# Patient Record
Sex: FEMALE | Race: BLACK OR AFRICAN AMERICAN | NOT HISPANIC OR LATINO
[De-identification: names, ages, dates, MRNs, and addresses within clinical notes are randomized per-mention and may not be internally consistent; named-entity substitution may affect disease eponyms.]

---

## 2020-08-03 PROBLEM — Z00.00 ENCOUNTER FOR PREVENTIVE HEALTH EXAMINATION: Status: ACTIVE | Noted: 2020-08-03

## 2020-08-06 ENCOUNTER — APPOINTMENT (OUTPATIENT)
Dept: BARIATRICS | Facility: CLINIC | Age: 35
End: 2020-08-06
Payer: MEDICAID

## 2020-08-06 VITALS — WEIGHT: 243 LBS | HEIGHT: 63 IN | BODY MASS INDEX: 43.05 KG/M2

## 2020-08-06 PROCEDURE — 99203 OFFICE O/P NEW LOW 30 MIN: CPT

## 2020-08-27 ENCOUNTER — APPOINTMENT (OUTPATIENT)
Dept: BARIATRICS | Facility: CLINIC | Age: 35
End: 2020-08-27
Payer: MEDICAID

## 2020-08-27 PROCEDURE — 97802 MEDICAL NUTRITION INDIV IN: CPT

## 2020-09-03 ENCOUNTER — APPOINTMENT (OUTPATIENT)
Dept: BARIATRICS | Facility: CLINIC | Age: 35
End: 2020-09-03
Payer: MEDICAID

## 2020-09-03 VITALS — WEIGHT: 234 LBS | BODY MASS INDEX: 41.45 KG/M2

## 2020-09-03 PROCEDURE — 97803 MED NUTRITION INDIV SUBSEQ: CPT

## 2020-09-09 ENCOUNTER — TRANSCRIPTION ENCOUNTER (OUTPATIENT)
Age: 35
End: 2020-09-09

## 2020-09-14 NOTE — HISTORY OF PRESENT ILLNESS
[Home] : at home, [unfilled] , at the time of the visit. [Other Location: e.g. Home (Enter Location, City,State)___] : at [unfilled] [Verbal consent obtained from patient] : the patient, [unfilled] [de-identified] : Patient is a 35 year old female with a long history of morbid obesity not responsive to multiple dietary regimens. She has a BMI of 43.1 and is interested in having bariatric surgery.

## 2020-09-17 ENCOUNTER — APPOINTMENT (OUTPATIENT)
Dept: BARIATRICS | Facility: CLINIC | Age: 35
End: 2020-09-17
Payer: MEDICAID

## 2020-09-17 PROCEDURE — 97803 MED NUTRITION INDIV SUBSEQ: CPT

## 2020-10-07 ENCOUNTER — APPOINTMENT (OUTPATIENT)
Dept: BARIATRICS | Facility: CLINIC | Age: 35
End: 2020-10-07
Payer: MEDICAID

## 2020-10-07 VITALS — BODY MASS INDEX: 41.98 KG/M2 | WEIGHT: 237 LBS

## 2020-10-07 PROCEDURE — 97803 MED NUTRITION INDIV SUBSEQ: CPT

## 2020-10-21 ENCOUNTER — APPOINTMENT (OUTPATIENT)
Dept: BARIATRICS | Facility: CLINIC | Age: 35
End: 2020-10-21

## 2020-12-03 ENCOUNTER — APPOINTMENT (OUTPATIENT)
Dept: BARIATRICS | Facility: CLINIC | Age: 35
End: 2020-12-03

## 2020-12-28 ENCOUNTER — NON-APPOINTMENT (OUTPATIENT)
Age: 35
End: 2020-12-28

## 2020-12-31 ENCOUNTER — APPOINTMENT (OUTPATIENT)
Dept: BARIATRICS | Facility: CLINIC | Age: 35
End: 2020-12-31
Payer: MEDICAID

## 2020-12-31 VITALS — BODY MASS INDEX: 38.26 KG/M2 | WEIGHT: 216 LBS

## 2020-12-31 PROCEDURE — 99072 ADDL SUPL MATRL&STAF TM PHE: CPT

## 2020-12-31 PROCEDURE — 97803 MED NUTRITION INDIV SUBSEQ: CPT

## 2021-01-14 ENCOUNTER — RESULT REVIEW (OUTPATIENT)
Age: 36
End: 2021-01-14

## 2021-01-14 ENCOUNTER — OUTPATIENT (OUTPATIENT)
Dept: OUTPATIENT SERVICES | Facility: HOSPITAL | Age: 36
LOS: 1 days | End: 2021-01-14
Payer: COMMERCIAL

## 2021-01-14 DIAGNOSIS — Z01.818 ENCOUNTER FOR OTHER PREPROCEDURAL EXAMINATION: ICD-10-CM

## 2021-01-14 LAB
ALBUMIN SERPL ELPH-MCNC: 4.3 G/DL — SIGNIFICANT CHANGE UP (ref 3.3–5)
ALP SERPL-CCNC: 101 U/L — SIGNIFICANT CHANGE UP (ref 40–120)
ALT FLD-CCNC: 10 U/L — SIGNIFICANT CHANGE UP (ref 10–45)
ANION GAP SERPL CALC-SCNC: 10 MMOL/L — SIGNIFICANT CHANGE UP (ref 5–17)
APPEARANCE UR: CLEAR — SIGNIFICANT CHANGE UP
APTT BLD: 30.6 SEC — SIGNIFICANT CHANGE UP (ref 27.5–35.5)
AST SERPL-CCNC: 13 U/L — SIGNIFICANT CHANGE UP (ref 10–40)
BASOPHILS # BLD AUTO: 0.04 K/UL — SIGNIFICANT CHANGE UP (ref 0–0.2)
BASOPHILS NFR BLD AUTO: 0.5 % — SIGNIFICANT CHANGE UP (ref 0–2)
BILIRUB SERPL-MCNC: 0.7 MG/DL — SIGNIFICANT CHANGE UP (ref 0.2–1.2)
BILIRUB UR-MCNC: NEGATIVE — SIGNIFICANT CHANGE UP
BUN SERPL-MCNC: 7 MG/DL — SIGNIFICANT CHANGE UP (ref 7–23)
CALCIUM SERPL-MCNC: 9.8 MG/DL — SIGNIFICANT CHANGE UP (ref 8.4–10.5)
CHLORIDE SERPL-SCNC: 102 MMOL/L — SIGNIFICANT CHANGE UP (ref 96–108)
CO2 SERPL-SCNC: 29 MMOL/L — SIGNIFICANT CHANGE UP (ref 22–31)
COLOR SPEC: SIGNIFICANT CHANGE UP
CREAT SERPL-MCNC: 0.69 MG/DL — SIGNIFICANT CHANGE UP (ref 0.5–1.3)
DIFF PNL FLD: NEGATIVE — SIGNIFICANT CHANGE UP
EOSINOPHIL # BLD AUTO: 0.06 K/UL — SIGNIFICANT CHANGE UP (ref 0–0.5)
EOSINOPHIL NFR BLD AUTO: 0.8 % — SIGNIFICANT CHANGE UP (ref 0–6)
GLUCOSE SERPL-MCNC: 92 MG/DL — SIGNIFICANT CHANGE UP (ref 70–99)
GLUCOSE UR QL: NEGATIVE — SIGNIFICANT CHANGE UP
HCT VFR BLD CALC: 38 % — SIGNIFICANT CHANGE UP (ref 34.5–45)
HGB BLD-MCNC: 11.5 G/DL — SIGNIFICANT CHANGE UP (ref 11.5–15.5)
IMM GRANULOCYTES NFR BLD AUTO: 0.4 % — SIGNIFICANT CHANGE UP (ref 0–1.5)
INR BLD: 1.12 — SIGNIFICANT CHANGE UP (ref 0.88–1.16)
KETONES UR-MCNC: NEGATIVE — SIGNIFICANT CHANGE UP
LEUKOCYTE ESTERASE UR-ACNC: NEGATIVE — SIGNIFICANT CHANGE UP
LYMPHOCYTES # BLD AUTO: 2.86 K/UL — SIGNIFICANT CHANGE UP (ref 1–3.3)
LYMPHOCYTES # BLD AUTO: 35.8 % — SIGNIFICANT CHANGE UP (ref 13–44)
MCHC RBC-ENTMCNC: 28.3 PG — SIGNIFICANT CHANGE UP (ref 27–34)
MCHC RBC-ENTMCNC: 30.3 GM/DL — LOW (ref 32–36)
MCV RBC AUTO: 93.6 FL — SIGNIFICANT CHANGE UP (ref 80–100)
MONOCYTES # BLD AUTO: 0.34 K/UL — SIGNIFICANT CHANGE UP (ref 0–0.9)
MONOCYTES NFR BLD AUTO: 4.3 % — SIGNIFICANT CHANGE UP (ref 2–14)
NEUTROPHILS # BLD AUTO: 4.66 K/UL — SIGNIFICANT CHANGE UP (ref 1.8–7.4)
NEUTROPHILS NFR BLD AUTO: 58.2 % — SIGNIFICANT CHANGE UP (ref 43–77)
NITRITE UR-MCNC: NEGATIVE — SIGNIFICANT CHANGE UP
NRBC # BLD: 0 /100 WBCS — SIGNIFICANT CHANGE UP (ref 0–0)
PH UR: 6 — SIGNIFICANT CHANGE UP (ref 5–8)
PLATELET # BLD AUTO: 376 K/UL — SIGNIFICANT CHANGE UP (ref 150–400)
POTASSIUM SERPL-MCNC: 4.7 MMOL/L — SIGNIFICANT CHANGE UP (ref 3.5–5.3)
POTASSIUM SERPL-SCNC: 4.7 MMOL/L — SIGNIFICANT CHANGE UP (ref 3.5–5.3)
PROT SERPL-MCNC: 7.6 G/DL — SIGNIFICANT CHANGE UP (ref 6–8.3)
PROT UR-MCNC: NEGATIVE MG/DL — SIGNIFICANT CHANGE UP
PROTHROM AB SERPL-ACNC: 13.4 SEC — SIGNIFICANT CHANGE UP (ref 10.6–13.6)
RBC # BLD: 4.06 M/UL — SIGNIFICANT CHANGE UP (ref 3.8–5.2)
RBC # FLD: 13.2 % — SIGNIFICANT CHANGE UP (ref 10.3–14.5)
SODIUM SERPL-SCNC: 141 MMOL/L — SIGNIFICANT CHANGE UP (ref 135–145)
SP GR SPEC: <=1.005 — SIGNIFICANT CHANGE UP (ref 1–1.03)
UROBILINOGEN FLD QL: 0.2 E.U./DL — SIGNIFICANT CHANGE UP
WBC # BLD: 7.99 K/UL — SIGNIFICANT CHANGE UP (ref 3.8–10.5)
WBC # FLD AUTO: 7.99 K/UL — SIGNIFICANT CHANGE UP (ref 3.8–10.5)

## 2021-01-14 PROCEDURE — 85730 THROMBOPLASTIN TIME PARTIAL: CPT

## 2021-01-14 PROCEDURE — 93010 ELECTROCARDIOGRAM REPORT: CPT

## 2021-01-14 PROCEDURE — 87086 URINE CULTURE/COLONY COUNT: CPT

## 2021-01-14 PROCEDURE — 85025 COMPLETE CBC W/AUTO DIFF WBC: CPT

## 2021-01-14 PROCEDURE — 85610 PROTHROMBIN TIME: CPT

## 2021-01-14 PROCEDURE — 71046 X-RAY EXAM CHEST 2 VIEWS: CPT

## 2021-01-14 PROCEDURE — 71046 X-RAY EXAM CHEST 2 VIEWS: CPT | Mod: 26

## 2021-01-14 PROCEDURE — 93005 ELECTROCARDIOGRAM TRACING: CPT

## 2021-01-14 PROCEDURE — 81003 URINALYSIS AUTO W/O SCOPE: CPT

## 2021-01-14 PROCEDURE — 80053 COMPREHEN METABOLIC PANEL: CPT

## 2021-01-16 LAB
CULTURE RESULTS: NO GROWTH — SIGNIFICANT CHANGE UP
SPECIMEN SOURCE: SIGNIFICANT CHANGE UP

## 2021-01-20 ENCOUNTER — APPOINTMENT (OUTPATIENT)
Dept: BARIATRICS | Facility: CLINIC | Age: 36
End: 2021-01-20
Payer: MEDICAID

## 2021-01-20 VITALS — WEIGHT: 216 LBS | BODY MASS INDEX: 38.26 KG/M2

## 2021-01-20 PROCEDURE — 99442: CPT

## 2021-01-20 NOTE — ASSESSMENT
[FreeTextEntry1] : I have answered all of her questions regarding the operation and the perioperative instructions.

## 2021-01-20 NOTE — HISTORY OF PRESENT ILLNESS
[Home] : at home, [unfilled] , at the time of the visit. [Medical Office: ([facility])___] : at the medical office located in  [Verbal consent obtained from patient] : the patient, [unfilled] [de-identified] : Patient is scheduled for a laparoscopic sleeve gastrectomy. She has lost some weight preoperatively. She has been medically cleared. No upper GI study due to her lack of GERD symptoms and her age.

## 2021-01-22 ENCOUNTER — LABORATORY RESULT (OUTPATIENT)
Age: 36
End: 2021-01-22

## 2021-01-24 ENCOUNTER — TRANSCRIPTION ENCOUNTER (OUTPATIENT)
Age: 36
End: 2021-01-24

## 2021-01-24 NOTE — H&P ADULT - NSHPPHYSICALEXAM_GEN_ALL_CORE
GEN: NAD, awake, eyes open spontaneously. Generous body habitus  HEENT: NCAT, MMM, Trachea midline, normal conjunctiva, perrl  CHEST/LUNGS: Nonlaboured breathing, CTAB, bilateral breath sounds  CARDIAC: RRR, no m/r/g  ABDOMEN: Soft, Nontender, Nondistended, No rebound/guarding  MSK: No oedema, no gross deformity of extremities  SKIN: No rashes, no petechiae, no vesicles  NEURO: CN grossly intact, normal coordination, no focal motor or sensory deficits  PSYCH: Alert, appropriate, cooperative, with capacity and insight

## 2021-01-24 NOTE — H&P ADULT - HISTORY OF PRESENT ILLNESS
35F with PMH of MO (BMI 43) PSH of  presents for elective LSG. Pt failed to lose weight with dietary and lifestyle measures. Denies recent travel or sick contacts

## 2021-01-25 ENCOUNTER — INPATIENT (INPATIENT)
Facility: HOSPITAL | Age: 36
LOS: 1 days | Discharge: HOME | DRG: 621 | End: 2021-01-27
Attending: SURGERY | Admitting: SURGERY
Payer: MEDICAID

## 2021-01-25 ENCOUNTER — RESULT REVIEW (OUTPATIENT)
Age: 36
End: 2021-01-25

## 2021-01-25 ENCOUNTER — APPOINTMENT (OUTPATIENT)
Dept: BARIATRICS | Facility: HOSPITAL | Age: 36
End: 2021-01-25

## 2021-01-25 VITALS
OXYGEN SATURATION: 98 % | TEMPERATURE: 98 F | HEIGHT: 62.99 IN | WEIGHT: 233.69 LBS | SYSTOLIC BLOOD PRESSURE: 102 MMHG | RESPIRATION RATE: 18 BRPM | HEART RATE: 79 BPM | DIASTOLIC BLOOD PRESSURE: 70 MMHG

## 2021-01-25 DIAGNOSIS — Z98.890 OTHER SPECIFIED POSTPROCEDURAL STATES: Chronic | ICD-10-CM

## 2021-01-25 LAB
BLD GP AB SCN SERPL QL: NEGATIVE — SIGNIFICANT CHANGE UP
HCT VFR BLD CALC: 36 % — SIGNIFICANT CHANGE UP (ref 34.5–45)
HGB BLD-MCNC: 11 G/DL — LOW (ref 11.5–15.5)
MCHC RBC-ENTMCNC: 29.1 PG — SIGNIFICANT CHANGE UP (ref 27–34)
MCHC RBC-ENTMCNC: 30.6 GM/DL — LOW (ref 32–36)
MCV RBC AUTO: 95.2 FL — SIGNIFICANT CHANGE UP (ref 80–100)
NRBC # BLD: 0 /100 WBCS — SIGNIFICANT CHANGE UP (ref 0–0)
PLATELET # BLD AUTO: 281 K/UL — SIGNIFICANT CHANGE UP (ref 150–400)
RBC # BLD: 3.78 M/UL — LOW (ref 3.8–5.2)
RBC # FLD: 13.3 % — SIGNIFICANT CHANGE UP (ref 10.3–14.5)
RH IG SCN BLD-IMP: NEGATIVE — SIGNIFICANT CHANGE UP
WBC # BLD: 13.91 K/UL — HIGH (ref 3.8–10.5)
WBC # FLD AUTO: 13.91 K/UL — HIGH (ref 3.8–10.5)

## 2021-01-25 PROCEDURE — 43775 LAP SLEEVE GASTRECTOMY: CPT | Mod: GC

## 2021-01-25 PROCEDURE — 43775 LAP SLEEVE GASTRECTOMY: CPT | Mod: AS

## 2021-01-25 PROCEDURE — 88307 TISSUE EXAM BY PATHOLOGIST: CPT | Mod: 26

## 2021-01-25 RX ORDER — ACETAMINOPHEN 500 MG
1000 TABLET ORAL ONCE
Refills: 0 | Status: COMPLETED | OUTPATIENT
Start: 2021-01-25 | End: 2021-01-25

## 2021-01-25 RX ORDER — KETOROLAC TROMETHAMINE 30 MG/ML
15 SYRINGE (ML) INJECTION EVERY 6 HOURS
Refills: 0 | Status: DISCONTINUED | OUTPATIENT
Start: 2021-01-25 | End: 2021-01-27

## 2021-01-25 RX ORDER — INFLUENZA VIRUS VACCINE 15; 15; 15; 15 UG/.5ML; UG/.5ML; UG/.5ML; UG/.5ML
0.5 SUSPENSION INTRAMUSCULAR ONCE
Refills: 0 | Status: DISCONTINUED | OUTPATIENT
Start: 2021-01-25 | End: 2021-01-27

## 2021-01-25 RX ORDER — SODIUM CHLORIDE 9 MG/ML
1000 INJECTION, SOLUTION INTRAVENOUS
Refills: 0 | Status: DISCONTINUED | OUTPATIENT
Start: 2021-01-25 | End: 2021-01-27

## 2021-01-25 RX ORDER — HYDROMORPHONE HYDROCHLORIDE 2 MG/ML
0.5 INJECTION INTRAMUSCULAR; INTRAVENOUS; SUBCUTANEOUS
Refills: 0 | Status: DISCONTINUED | OUTPATIENT
Start: 2021-01-25 | End: 2021-01-25

## 2021-01-25 RX ORDER — PANTOPRAZOLE SODIUM 20 MG/1
40 TABLET, DELAYED RELEASE ORAL DAILY
Refills: 0 | Status: DISCONTINUED | OUTPATIENT
Start: 2021-01-25 | End: 2021-01-27

## 2021-01-25 RX ORDER — ENOXAPARIN SODIUM 100 MG/ML
40 INJECTION SUBCUTANEOUS ONCE
Refills: 0 | Status: COMPLETED | OUTPATIENT
Start: 2021-01-25 | End: 2021-01-25

## 2021-01-25 RX ORDER — SCOPALAMINE 1 MG/3D
1 PATCH, EXTENDED RELEASE TRANSDERMAL ONCE
Refills: 0 | Status: COMPLETED | OUTPATIENT
Start: 2021-01-25 | End: 2021-01-25

## 2021-01-25 RX ORDER — ONDANSETRON 8 MG/1
4 TABLET, FILM COATED ORAL EVERY 6 HOURS
Refills: 0 | Status: DISCONTINUED | OUTPATIENT
Start: 2021-01-25 | End: 2021-01-27

## 2021-01-25 RX ORDER — GABAPENTIN 400 MG/1
300 CAPSULE ORAL ONCE
Refills: 0 | Status: COMPLETED | OUTPATIENT
Start: 2021-01-25 | End: 2021-01-25

## 2021-01-25 RX ORDER — ACETAMINOPHEN 500 MG
650 TABLET ORAL EVERY 6 HOURS
Refills: 0 | Status: DISCONTINUED | OUTPATIENT
Start: 2021-01-25 | End: 2021-01-27

## 2021-01-25 RX ADMIN — SCOPALAMINE 1 PATCH: 1 PATCH, EXTENDED RELEASE TRANSDERMAL at 12:05

## 2021-01-25 RX ADMIN — Medication 1000 MILLIGRAM(S): at 12:05

## 2021-01-25 RX ADMIN — SCOPALAMINE 1 PATCH: 1 PATCH, EXTENDED RELEASE TRANSDERMAL at 16:56

## 2021-01-25 RX ADMIN — GABAPENTIN 300 MILLIGRAM(S): 400 CAPSULE ORAL at 12:05

## 2021-01-25 RX ADMIN — Medication 15 MILLIGRAM(S): at 19:26

## 2021-01-25 RX ADMIN — HYDROMORPHONE HYDROCHLORIDE 0.5 MILLIGRAM(S): 2 INJECTION INTRAMUSCULAR; INTRAVENOUS; SUBCUTANEOUS at 15:10

## 2021-01-25 RX ADMIN — Medication 400 MILLIGRAM(S): at 23:13

## 2021-01-25 RX ADMIN — PANTOPRAZOLE SODIUM 40 MILLIGRAM(S): 20 TABLET, DELAYED RELEASE ORAL at 15:14

## 2021-01-25 RX ADMIN — ENOXAPARIN SODIUM 40 MILLIGRAM(S): 100 INJECTION SUBCUTANEOUS at 12:05

## 2021-01-25 RX ADMIN — Medication 400 MILLIGRAM(S): at 17:56

## 2021-01-25 NOTE — BRIEF OPERATIVE NOTE - OPERATION/FINDINGS
Sleeve gastrectomy performed over 38 Fr. bougie with x1 green staple load and x5 blue staple loads. Staple line oversewn with 2-0 PDS running suture with omentopexy.

## 2021-01-26 ENCOUNTER — TRANSCRIPTION ENCOUNTER (OUTPATIENT)
Age: 36
End: 2021-01-26

## 2021-01-26 LAB
ANION GAP SERPL CALC-SCNC: 12 MMOL/L — SIGNIFICANT CHANGE UP (ref 5–17)
BASOPHILS # BLD AUTO: 0.02 K/UL — SIGNIFICANT CHANGE UP (ref 0–0.2)
BASOPHILS NFR BLD AUTO: 0.2 % — SIGNIFICANT CHANGE UP (ref 0–2)
BUN SERPL-MCNC: 5 MG/DL — LOW (ref 7–23)
CALCIUM SERPL-MCNC: 8.7 MG/DL — SIGNIFICANT CHANGE UP (ref 8.4–10.5)
CHLORIDE SERPL-SCNC: 102 MMOL/L — SIGNIFICANT CHANGE UP (ref 96–108)
CO2 SERPL-SCNC: 23 MMOL/L — SIGNIFICANT CHANGE UP (ref 22–31)
CREAT SERPL-MCNC: 0.62 MG/DL — SIGNIFICANT CHANGE UP (ref 0.5–1.3)
EOSINOPHIL # BLD AUTO: 0 K/UL — SIGNIFICANT CHANGE UP (ref 0–0.5)
EOSINOPHIL NFR BLD AUTO: 0 % — SIGNIFICANT CHANGE UP (ref 0–6)
GLUCOSE SERPL-MCNC: 94 MG/DL — SIGNIFICANT CHANGE UP (ref 70–99)
HCT VFR BLD CALC: 31.8 % — LOW (ref 34.5–45)
HGB BLD-MCNC: 10.1 G/DL — LOW (ref 11.5–15.5)
IMM GRANULOCYTES NFR BLD AUTO: 0.2 % — SIGNIFICANT CHANGE UP (ref 0–1.5)
LYMPHOCYTES # BLD AUTO: 1.95 K/UL — SIGNIFICANT CHANGE UP (ref 1–3.3)
LYMPHOCYTES # BLD AUTO: 16.1 % — SIGNIFICANT CHANGE UP (ref 13–44)
MAGNESIUM SERPL-MCNC: 1.6 MG/DL — SIGNIFICANT CHANGE UP (ref 1.6–2.6)
MCHC RBC-ENTMCNC: 29.5 PG — SIGNIFICANT CHANGE UP (ref 27–34)
MCHC RBC-ENTMCNC: 31.8 GM/DL — LOW (ref 32–36)
MCV RBC AUTO: 93 FL — SIGNIFICANT CHANGE UP (ref 80–100)
MONOCYTES # BLD AUTO: 0.79 K/UL — SIGNIFICANT CHANGE UP (ref 0–0.9)
MONOCYTES NFR BLD AUTO: 6.5 % — SIGNIFICANT CHANGE UP (ref 2–14)
NEUTROPHILS # BLD AUTO: 9.29 K/UL — HIGH (ref 1.8–7.4)
NEUTROPHILS NFR BLD AUTO: 77 % — SIGNIFICANT CHANGE UP (ref 43–77)
NRBC # BLD: 0 /100 WBCS — SIGNIFICANT CHANGE UP (ref 0–0)
PHOSPHATE SERPL-MCNC: 3.4 MG/DL — SIGNIFICANT CHANGE UP (ref 2.5–4.5)
PLATELET # BLD AUTO: 269 K/UL — SIGNIFICANT CHANGE UP (ref 150–400)
POTASSIUM SERPL-MCNC: 4 MMOL/L — SIGNIFICANT CHANGE UP (ref 3.5–5.3)
POTASSIUM SERPL-SCNC: 4 MMOL/L — SIGNIFICANT CHANGE UP (ref 3.5–5.3)
RBC # BLD: 3.42 M/UL — LOW (ref 3.8–5.2)
RBC # FLD: 13.3 % — SIGNIFICANT CHANGE UP (ref 10.3–14.5)
SODIUM SERPL-SCNC: 137 MMOL/L — SIGNIFICANT CHANGE UP (ref 135–145)
WBC # BLD: 12.08 K/UL — HIGH (ref 3.8–10.5)
WBC # FLD AUTO: 12.08 K/UL — HIGH (ref 3.8–10.5)

## 2021-01-26 RX ORDER — HEPARIN SODIUM 5000 [USP'U]/ML
7500 INJECTION INTRAVENOUS; SUBCUTANEOUS EVERY 8 HOURS
Refills: 0 | Status: DISCONTINUED | OUTPATIENT
Start: 2021-01-26 | End: 2021-01-27

## 2021-01-26 RX ORDER — MAGNESIUM SULFATE 500 MG/ML
2 VIAL (ML) INJECTION EVERY 4 HOURS
Refills: 0 | Status: COMPLETED | OUTPATIENT
Start: 2021-01-26 | End: 2021-01-26

## 2021-01-26 RX ORDER — METOCLOPRAMIDE HCL 10 MG
10 TABLET ORAL ONCE
Refills: 0 | Status: COMPLETED | OUTPATIENT
Start: 2021-01-26 | End: 2021-01-26

## 2021-01-26 RX ORDER — DEXAMETHASONE 0.5 MG/5ML
6 ELIXIR ORAL ONCE
Refills: 0 | Status: COMPLETED | OUTPATIENT
Start: 2021-01-26 | End: 2021-01-26

## 2021-01-26 RX ADMIN — Medication 50 GRAM(S): at 13:03

## 2021-01-26 RX ADMIN — Medication 15 MILLIGRAM(S): at 01:15

## 2021-01-26 RX ADMIN — ONDANSETRON 4 MILLIGRAM(S): 8 TABLET, FILM COATED ORAL at 09:21

## 2021-01-26 RX ADMIN — Medication 15 MILLIGRAM(S): at 17:04

## 2021-01-26 RX ADMIN — Medication 10 MILLIGRAM(S): at 03:34

## 2021-01-26 RX ADMIN — PANTOPRAZOLE SODIUM 40 MILLIGRAM(S): 20 TABLET, DELAYED RELEASE ORAL at 13:03

## 2021-01-26 RX ADMIN — HEPARIN SODIUM 7500 UNIT(S): 5000 INJECTION INTRAVENOUS; SUBCUTANEOUS at 17:04

## 2021-01-26 RX ADMIN — SCOPALAMINE 1 PATCH: 1 PATCH, EXTENDED RELEASE TRANSDERMAL at 06:42

## 2021-01-26 RX ADMIN — HEPARIN SODIUM 7500 UNIT(S): 5000 INJECTION INTRAVENOUS; SUBCUTANEOUS at 09:20

## 2021-01-26 RX ADMIN — Medication 6 MILLIGRAM(S): at 07:21

## 2021-01-26 RX ADMIN — ONDANSETRON 4 MILLIGRAM(S): 8 TABLET, FILM COATED ORAL at 01:35

## 2021-01-26 RX ADMIN — Medication 50 GRAM(S): at 09:21

## 2021-01-26 NOTE — DIETITIAN INITIAL EVALUATION ADULT. - OTHER INFO
35F with PMH of MO (BMI 43) PSH of  now s/p elective LSG () now POD #1.     On assessment, pt resting in bed. Currently on BARICLLIQ diet, tolerating PO. Pt had adequate PO intake this morning, consuming 4 oz fluids prior to 9:30 am. Pt c/o some nausea this AM- RN made aware. Discussed volumes of various cup sizes on tray table and encouraged aiming for 4 oz/hr as tolerated. Prepared with protein shakes (Premier) and appropriate vitamins at home. RD provided in-depth edu on diet advancement and specific nutrient needs s/p LSG. NKFA. No dietary restrictions at home. Pt endorsing intentional wt loss of 30 lbs from UBW of 246 lbs x 5 months. Skin: surgical incisions. GI: WDL per flowsheet. Reviewed importance of adequate protein intake to maintain lean muscle mass with expected weight loss s/p bariatric surgery. Pt verbalized understanding. Reviewed importance of maintaining adequate hydration to prevent complications from dehydration. Pt understanding of importance. RD to follow up per protocol.

## 2021-01-26 NOTE — DISCHARGE NOTE PROVIDER - NSDCCPGOAL_GEN_ALL_CORE_FT
To get better and follow your care plan as instructed. To get better and follow your care plan as instructed.  1) Please take Tylenol 650 mg every 4 to 6 hours by mouth for moderate pain control. Please do not exceed over 4,000 mg of Tylenol a day. 2) Please start taking Eliquis 2.5 mg by mouth twice a day starting Thursday January 28, 2021 3 days after surgery . 3) Please take Omeprazole 40 mg once a day by mouth.

## 2021-01-26 NOTE — DISCHARGE NOTE PROVIDER - NSDCCPCAREPLAN_GEN_ALL_CORE_FT
PRINCIPAL DISCHARGE DIAGNOSIS  Diagnosis: Morbid obesity  Assessment and Plan of Treatment:        PRINCIPAL DISCHARGE DIAGNOSIS  Diagnosis: Morbid obesity  Assessment and Plan of Treatment: 36 yo F with PMH of MO (BMI 43) PSH of  presents for elective LSG. Now s/p Robotic-assisted Sleeve Gastrectomy (). Pt tolerated the procedure well.  At time of discharge, pt was tolerating a bariatric clear liquid diet, and pt's pain was controlled. Plan is to follow up with Dr. Loredo in the office.  1) Please take Tylenol 650 mg every 4 to 6 hours by mouth for moderate pain control. Please do not exceed over 4,000 mg of Tylenol a day.  2) Please start taking Eliquis 2.5 mg by mouth twice a day starting  3 days after surgery .  3) Please take Omeprazole 40 mg once a day by mouth.

## 2021-01-26 NOTE — DISCHARGE NOTE PROVIDER - HOSPITAL COURSE
36 yo F with PMH of MO (BMI 43) PSH of  presented on day of admission for elective LSG. Post operatively patient was admitted to surgical floor for furthermonitoring and management. Her postoperative course was unremarkable with advancement of diet, passing trial of void, and pain control. On day of discharge patient was stable to be d/c'd home.       34 yo F with PMH of MO (BMI 43) PSH of  presents for elective LSG. Now s/p Robotic-assisted Sleeve Gastrectomy (). Pt tolerated the procedure well.  At time of discharge, pt was tolerating a bariatric clear liquid diet, and pt's pain was controlled. Plan is to follow up with Dr. Loredo in the office.

## 2021-01-26 NOTE — DISCHARGE NOTE PROVIDER - CARE PROVIDER_API CALL
Bucky Loredo (MD)  Surgery  [address]  [place], [place] [address]  Phone: [phone]  Fax: [phone]  Follow Up Time: 1 week

## 2021-01-26 NOTE — DIETITIAN INITIAL EVALUATION ADULT. - PERTINENT MEDS FT
Patient is a 89 year old female with PMH of T2DM, DLD, dementia, breast cancer s/p L hip IMN and R proximal femur CRPP POD #4 presenting for L hip fracture. LR 1000 ml, Zofran, Protonix

## 2021-01-26 NOTE — DISCHARGE NOTE PROVIDER - NSDCMRMEDTOKEN_GEN_ALL_CORE_FT
Eliquis 2.5 mg oral tablet: 1 tab(s) orally 2 times a day MDD:2 tablets  omeprazole 40 mg oral delayed release capsule: 1 cap(s) orally once a day MDD:1 capsule  Tylenol Regular Strength 325 mg oral tablet: 2 tab(s) orally every 6 hours, As Needed -for moderate pain - for severe pain MDD:8 tablets

## 2021-01-26 NOTE — DIETITIAN INITIAL EVALUATION ADULT. - OTHER CALCULATIONS
IBW used as pt exceeds 120% IBW (212%). Above energy needs calculated for wt maintenance (20-25kcal/kg).   Weeks 1-2 estimated needs: kcal/day (10-12kcal/kg), g pro/day (1.5-2.1g/kg), >/=64oz clear fluids. IBW used as pt exceeds 120% IBW (202%). Above energy needs calculated for wt maintenance (20-25kcal/kg).   Weeks 1-2 estimated needs: kcal/day (10-12kcal/kg), g pro/day (1.5-2.1g/kg), >/=64oz clear fluids.

## 2021-01-26 NOTE — DISCHARGE NOTE PROVIDER - NSDCFUADDINST_GEN_ALL_CORE_FT
Follow up with  in 1 week. Call the office at  to schedule your appointment.  You may shower; soap and water over incision sites. Do not scrub. Pat dry when done. No tub bathing or swimming until cleared. Keep incision sites out of the sun as scars will darken. No heavy lifting (>10lbs) or strenuous exercise. Diet: Bariatric Full Fluids. 60 grams protein daily.  64 fluid ounces water daily. Drink small sips throughout the day. Continue diet as outlined by paperwork received as a pre-operative patient. You should be urinating at least 3-4x per day. Call the office if you experience increasing abdominal pain, nausea, vomiting, or temperature >100.4F.  NO ASPIRIN OR NSAIDs until approved by Dr. Loredo. Avoid alcoholic beverages until cleared by Dr. Loredo.   Follow up with  in 1 week. Call the office at  to schedule your appointment.  You may shower; soap and water over incision sites. Do not scrub. Pat dry when done. No tub bathing or swimming until cleared. Keep incision sites out of the sun as scars will darken. No heavy lifting (>10lbs) or strenuous exercise. Diet: Bariatric Full Fluids. 60 grams protein daily.  64 fluid ounces water daily. Drink small sips throughout the day. Continue diet as outlined by paperwork received as a pre-operative patient. You should be urinating at least 3-4x per day. Call the office if you experience increasing abdominal pain, nausea, vomiting, or temperature >100.4F.  NO ASPIRIN OR NSAIDs until approved by Dr. Loredo. Avoid alcoholic beverages until cleared by Dr. Loredo.  1) Please take Tylenol 650 mg every 4 to 6 hours by mouth for moderate pain control. Please do not exceed over 4,000 mg of Tylenol a day. 2) Please start taking Eliquis 2.5 mg by mouth twice a day starting Thursday January 28, 2021 3 days after surgery . 3) Please take Omeprazole 40 mg once a day by mouth.

## 2021-01-27 ENCOUNTER — TRANSCRIPTION ENCOUNTER (OUTPATIENT)
Age: 36
End: 2021-01-27

## 2021-01-27 VITALS
DIASTOLIC BLOOD PRESSURE: 66 MMHG | TEMPERATURE: 98 F | RESPIRATION RATE: 16 BRPM | OXYGEN SATURATION: 98 % | HEART RATE: 56 BPM | SYSTOLIC BLOOD PRESSURE: 108 MMHG

## 2021-01-27 LAB
ANION GAP SERPL CALC-SCNC: 12 MMOL/L — SIGNIFICANT CHANGE UP (ref 5–17)
BUN SERPL-MCNC: 8 MG/DL — SIGNIFICANT CHANGE UP (ref 7–23)
CALCIUM SERPL-MCNC: 8.8 MG/DL — SIGNIFICANT CHANGE UP (ref 8.4–10.5)
CHLORIDE SERPL-SCNC: 101 MMOL/L — SIGNIFICANT CHANGE UP (ref 96–108)
CO2 SERPL-SCNC: 25 MMOL/L — SIGNIFICANT CHANGE UP (ref 22–31)
CREAT SERPL-MCNC: 0.7 MG/DL — SIGNIFICANT CHANGE UP (ref 0.5–1.3)
GLUCOSE SERPL-MCNC: 80 MG/DL — SIGNIFICANT CHANGE UP (ref 70–99)
HCT VFR BLD CALC: 32.1 % — LOW (ref 34.5–45)
HGB BLD-MCNC: 10.1 G/DL — LOW (ref 11.5–15.5)
MAGNESIUM SERPL-MCNC: 1.9 MG/DL — SIGNIFICANT CHANGE UP (ref 1.6–2.6)
MCHC RBC-ENTMCNC: 29.4 PG — SIGNIFICANT CHANGE UP (ref 27–34)
MCHC RBC-ENTMCNC: 31.5 GM/DL — LOW (ref 32–36)
MCV RBC AUTO: 93.6 FL — SIGNIFICANT CHANGE UP (ref 80–100)
NRBC # BLD: 0 /100 WBCS — SIGNIFICANT CHANGE UP (ref 0–0)
PHOSPHATE SERPL-MCNC: 2.4 MG/DL — LOW (ref 2.5–4.5)
PLATELET # BLD AUTO: 278 K/UL — SIGNIFICANT CHANGE UP (ref 150–400)
POTASSIUM SERPL-MCNC: 3.6 MMOL/L — SIGNIFICANT CHANGE UP (ref 3.5–5.3)
POTASSIUM SERPL-SCNC: 3.6 MMOL/L — SIGNIFICANT CHANGE UP (ref 3.5–5.3)
RBC # BLD: 3.43 M/UL — LOW (ref 3.8–5.2)
RBC # FLD: 13.7 % — SIGNIFICANT CHANGE UP (ref 10.3–14.5)
SODIUM SERPL-SCNC: 138 MMOL/L — SIGNIFICANT CHANGE UP (ref 135–145)
SURGICAL PATHOLOGY STUDY: SIGNIFICANT CHANGE UP
WBC # BLD: 12.52 K/UL — HIGH (ref 3.8–10.5)
WBC # FLD AUTO: 12.52 K/UL — HIGH (ref 3.8–10.5)

## 2021-01-27 PROCEDURE — 86900 BLOOD TYPING SEROLOGIC ABO: CPT

## 2021-01-27 PROCEDURE — 84100 ASSAY OF PHOSPHORUS: CPT

## 2021-01-27 PROCEDURE — 85025 COMPLETE CBC W/AUTO DIFF WBC: CPT

## 2021-01-27 PROCEDURE — 80048 BASIC METABOLIC PNL TOTAL CA: CPT

## 2021-01-27 PROCEDURE — 88307 TISSUE EXAM BY PATHOLOGIST: CPT

## 2021-01-27 PROCEDURE — 86850 RBC ANTIBODY SCREEN: CPT

## 2021-01-27 PROCEDURE — 90686 IIV4 VACC NO PRSV 0.5 ML IM: CPT

## 2021-01-27 PROCEDURE — 85027 COMPLETE CBC AUTOMATED: CPT

## 2021-01-27 PROCEDURE — S2900: CPT

## 2021-01-27 PROCEDURE — 83735 ASSAY OF MAGNESIUM: CPT

## 2021-01-27 PROCEDURE — 86901 BLOOD TYPING SEROLOGIC RH(D): CPT

## 2021-01-27 PROCEDURE — 36415 COLL VENOUS BLD VENIPUNCTURE: CPT

## 2021-01-27 RX ORDER — APIXABAN 2.5 MG/1
1 TABLET, FILM COATED ORAL
Qty: 60 | Refills: 0
Start: 2021-01-27 | End: 2021-02-25

## 2021-01-27 RX ORDER — OMEPRAZOLE 10 MG/1
1 CAPSULE, DELAYED RELEASE ORAL
Qty: 30 | Refills: 0
Start: 2021-01-27 | End: 2021-02-25

## 2021-01-27 RX ORDER — MAGNESIUM SULFATE 500 MG/ML
2 VIAL (ML) INJECTION
Refills: 0 | Status: DISCONTINUED | OUTPATIENT
Start: 2021-01-27 | End: 2021-01-27

## 2021-01-27 RX ORDER — ACETAMINOPHEN 500 MG
2 TABLET ORAL
Qty: 32 | Refills: 0
Start: 2021-01-27 | End: 2021-01-30

## 2021-01-27 RX ORDER — MAGNESIUM SULFATE 500 MG/ML
2 VIAL (ML) INJECTION ONCE
Refills: 0 | Status: DISCONTINUED | OUTPATIENT
Start: 2021-01-27 | End: 2021-01-27

## 2021-01-27 RX ADMIN — HEPARIN SODIUM 7500 UNIT(S): 5000 INJECTION INTRAVENOUS; SUBCUTANEOUS at 09:57

## 2021-01-27 RX ADMIN — Medication 50 GRAM(S): at 09:57

## 2021-01-27 RX ADMIN — Medication 15 MILLIGRAM(S): at 01:57

## 2021-01-27 RX ADMIN — HEPARIN SODIUM 7500 UNIT(S): 5000 INJECTION INTRAVENOUS; SUBCUTANEOUS at 00:03

## 2021-01-27 RX ADMIN — SCOPALAMINE 1 PATCH: 1 PATCH, EXTENDED RELEASE TRANSDERMAL at 05:56

## 2021-01-27 NOTE — PROGRESS NOTE ADULT - SUBJECTIVE AND OBJECTIVE BOX
POST-OPERATIVE NOTE    Procedure: Robotic-assisted Sleeve Gastrectomy    Diagnosis/Indication: Morbid Obesity    Surgeon: Dr. Gertrude ISLAS: The patient does not have any complaints. She was examined at the bedside. The patient denies having chest pain, SOB, dizziness, chills, nausea, vomiting.    O:  T(C): 36.4 (21 @ 14:47), Max: 36.4 (21 @ 14:47)  T(F): 97.5 (21 @ 14:47), Max: 97.5 (21 @ 14:47)  HR: 97 (21 @ 16:42) (74 - 109)  BP: 123/80 (21 @ 16:42) (115/68 - 140/90)  RR: 17 (21 @ 16:42) (16 - 19)  SpO2: 100% (21 @ 16:42) (96% - 100%)  Wt(kg): --            Gen: NAD, resting comfortably in bed  C/V: NSR, S1, S2 present  Pulm: Nonlabored breathing, no respiratory distress  Abd: soft, ND, appropriately TTP @ the incision sites, no rebound tenderness, guarding, rigidity, incisions C/D/I  Neuro: AAOx3      A/P: 34 yo Female with PMH of MO (BMI 43) PSH of  presents for elective LSG. Now s/p Robotic-assisted Sleeve Gastrectomy ().    BCLD/IVF  Pain and nausea control  Protonix  CBC 6 hours post-op  SQH POD1 if Hgb stable  AM labs  Nutrition consult in AM  Home meds as appropriate    
INTERVAL HPI/OVERNIGHT EVENTS: pain controlled, encouraged pt to ambulate, use IS and keep track of po intake, post op h/h: 11.0/36.0, passed TOV (200cc), VSS  1/25: LSG, POC WNL      STATUS POST:  Robotic-Assisted Sleeve Gastrectomy    POST OPERATIVE DAY #: 1    SUBJECTIVE:  Patient examined bedside with chief. Patient complains of nausea and spit up. Patient reports her incisional pain is well controlled. Patient reports she is ambulating and using incentive spirometer. Patient denies SOB and chest pain, . Patient making adequate urine output.        Vital Signs Last 24 Hrs  T(C): 36.9 (26 Jan 2021 05:44), Max: 37 (26 Jan 2021 00:37)  T(F): 98.4 (26 Jan 2021 05:44), Max: 98.6 (26 Jan 2021 00:37)  HR: 89 (26 Jan 2021 05:44) (74 - 110)  BP: 116/76 (26 Jan 2021 05:44) (102/70 - 140/90)  BP(mean): 96 (25 Jan 2021 17:12) (86 - 109)  RR: 18 (26 Jan 2021 05:44) (16 - 19)  SpO2: 98% (26 Jan 2021 05:44) (94% - 100%)  I&O's Detail    25 Jan 2021 07:01  -  26 Jan 2021 07:00  --------------------------------------------------------  IN:    IV PiggyBack: 100 mL    Lactated Ringers: 2212.5 mL  Total IN: 2312.5 mL    OUT:    Oral Fluid: 0 mL    Voided (mL): 800 mL  Total OUT: 800 mL    Total NET: 1512.5 mL          General: NAD, resting comfortably in bed  C/V: NSR  Pulm: Nonlabored breathing, no respiratory distress  Abd:   Extrem: WWP, no edema, SCDs in place        LABS:                        11.0   13.91 )-----------( 281      ( 25 Jan 2021 20:23 )             36.0               
INTERVAL HPI/OVERNIGHT EVENTS: pt tolerating po better, -n/-v, VSS   1/26:c/o nausea /emesisx1 , minimal po intake , given decadron, encouraged to walk , VSS       STATUS POST:  Robotic- Assisted Sleeve Gastrectomy    POST OPERATIVE DAY #: 2    SUBJECTIVE: Patient examined bedside with chief. Patient reports that she is tolerating diet and her pain is well controlled. Patient reports she is ambulating and using incentive spirometer. Patient denies SOB, chest pain, nausea and emesis. Patient making adequate urine output.      heparin   Injectable 7500 Unit(s) SubCutaneous every 8 hours      Vital Signs Last 24 Hrs  T(C): 36.9 (27 Jan 2021 05:15), Max: 37.6 (26 Jan 2021 16:05)  T(F): 98.4 (27 Jan 2021 05:15), Max: 99.6 (26 Jan 2021 16:05)  HR: 77 (27 Jan 2021 05:15) (69 - 77)  BP: 100/65 (27 Jan 2021 05:15) (100/63 - 123/79)  BP(mean): 88 (26 Jan 2021 16:05) (88 - 88)  RR: 18 (27 Jan 2021 05:15) (16 - 18)  SpO2: 95% (27 Jan 2021 05:15) (95% - 99%)  I&O's Detail    25 Jan 2021 07:01  -  26 Jan 2021 07:00  --------------------------------------------------------  IN:    IV PiggyBack: 100 mL    Lactated Ringers: 2212.5 mL  Total IN: 2312.5 mL    OUT:    Oral Fluid: 0 mL    Voided (mL): 800 mL  Total OUT: 800 mL    Total NET: 1512.5 mL      26 Jan 2021 07:01  -  27 Jan 2021 06:09  --------------------------------------------------------  IN:    IV PiggyBack: 100 mL    Lactated Ringers: 3150 mL    Oral Fluid: 220 mL  Total IN: 3470 mL    OUT:    Voided (mL): 2550 mL  Total OUT: 2550 mL    Total NET: 920 mL          General: NAD, resting comfortably in bed  C/V: NSR  Pulm: Nonlabored breathing, no respiratory distress  Abd: Soft, non-distended, TTP around incision site, incision clean dry and intact  Extrem: WWP, no edema, SCDs in place      LABS:                        10.1   12.08 )-----------( 269      ( 26 Jan 2021 08:09 )             31.8     01-26    137  |  102  |  5<L>  ----------------------------<  94  4.0   |  23  |  0.62    Ca    8.7      26 Jan 2021 08:09  Phos  3.4     01-26  Mg     1.6     01-26

## 2021-01-27 NOTE — PROGRESS NOTE ADULT - ASSESSMENT
36 yo F with PMH of MO (BMI 43) PSH of  presents for elective LSG. Now s/p Robotic-assisted Sleeve Gastrectomy ().    BCLD/IVF  Pain and nausea control  Protonix  SQH POD1 if Hgb stable  AM labs  Nutrition consult in AM  Home meds as appropriate  
36 yo F with PMH of MO (BMI 43) PSH of  presents for elective LSG. Now s/p Robotic-assisted Sleeve Gastrectomy ().    BCLD/IVF  Pain and nausea control  Protonix  SCDs/IS/OOBA/SQH  AM labs  Home meds as appropriate

## 2021-01-27 NOTE — DISCHARGE NOTE NURSING/CASE MANAGEMENT/SOCIAL WORK - PATIENT PORTAL LINK FT
You can access the FollowMyHealth Patient Portal offered by [facility] [place] by registering at the following website: [email or web address]. By joining [email or web address]’s FollowMyHealth portal, you will also be able to view your health information using other applications (apps) compatible with our system.

## 2021-01-29 ENCOUNTER — NON-APPOINTMENT (OUTPATIENT)
Age: 36
End: 2021-01-29

## 2021-02-01 DIAGNOSIS — E66.01 MORBID (SEVERE) OBESITY DUE TO EXCESS CALORIES: ICD-10-CM

## 2021-02-08 PROBLEM — E66.9 OBESITY, UNSPECIFIED: Chronic | Status: ACTIVE | Noted: 2021-01-22

## 2021-02-11 ENCOUNTER — APPOINTMENT (OUTPATIENT)
Dept: RADIOLOGY | Facility: HOSPITAL | Age: 36
End: 2021-02-11

## 2021-04-13 ENCOUNTER — NON-APPOINTMENT (OUTPATIENT)
Age: 36
End: 2021-04-13

## 2021-05-06 ENCOUNTER — NON-APPOINTMENT (OUTPATIENT)
Age: 36
End: 2021-05-06

## 2021-05-27 ENCOUNTER — APPOINTMENT (OUTPATIENT)
Dept: BARIATRICS | Facility: CLINIC | Age: 36
End: 2021-05-27
Payer: MEDICAID

## 2021-05-27 VITALS — HEIGHT: 63 IN | WEIGHT: 175 LBS | BODY MASS INDEX: 31.01 KG/M2

## 2021-05-27 DIAGNOSIS — Z98.84 BARIATRIC SURGERY STATUS: ICD-10-CM

## 2021-05-27 DIAGNOSIS — E66.01 MORBID (SEVERE) OBESITY DUE TO EXCESS CALORIES: ICD-10-CM

## 2021-05-27 PROCEDURE — 99442: CPT

## 2021-05-27 NOTE — HISTORY OF PRESENT ILLNESS
[Home] : at home, [unfilled] , at the time of the visit. [Medical Office: ([facility])___] : at the medical office located in  [Verbal consent obtained from patient] : the patient, [unfilled] [de-identified] : Ms. De La Cruz returns to see us today for a first postoperative visit s/p sleeve gastrectomy on 1/25. Patient reports she has been doing okay, down to 175lbs. Denies pain but does endorse some n/v with certain foods not tolerated such as chicken and seafood. Protein intake includes protein shakes, salmon, muscle milk, chicken/beef soup, beans. Seems to be meeting protein goals but seems to not be eating enough due to not feeling very hungry sometimes. Discussed healthy eating habits including not skipping meals and snacking on healthy snacks every 2-3 hours. She admits to not exercising yet, takes mv and b12 but has not taken citracal.

## 2021-05-27 NOTE — PLAN
[FreeTextEntry1] : Follow up with dietitian to review healthy eating habits\par Begin exercising\par Take multivitamin, citracal and b12\par Labs ordered, f/u with results\par Follow up in 3 months

## 2021-05-27 NOTE — ASSESSMENT
[FreeTextEntry1] : 35 year old female 4 months s/p sleeve gastrectomy. Patient was actively engaged in discussion of having protein and fiber with every meal, not skipping meals and having healthy snacks every couple of hours to help boost metabolism. Discussed starting to exercise including strength and resistance training and will review vitamin regimen with dietitian.

## 2021-06-19 ENCOUNTER — EMERGENCY (EMERGENCY)
Facility: HOSPITAL | Age: 36
LOS: 1 days | Discharge: HOME | End: 2021-06-19
Attending: EMERGENCY MEDICINE | Admitting: EMERGENCY MEDICINE
Payer: MEDICAID

## 2021-06-19 VITALS
SYSTOLIC BLOOD PRESSURE: 108 MMHG | HEIGHT: 66 IN | RESPIRATION RATE: 18 BRPM | HEART RATE: 90 BPM | WEIGHT: 139.99 LBS | OXYGEN SATURATION: 95 % | TEMPERATURE: 98 F | DIASTOLIC BLOOD PRESSURE: 76 MMHG

## 2021-06-19 DIAGNOSIS — Z98.84 BARIATRIC SURGERY STATUS: ICD-10-CM

## 2021-06-19 DIAGNOSIS — Z3A.01 LESS THAN 8 WEEKS GESTATION OF PREGNANCY: ICD-10-CM

## 2021-06-19 DIAGNOSIS — R42 DIZZINESS AND GIDDINESS: ICD-10-CM

## 2021-06-19 DIAGNOSIS — R55 SYNCOPE AND COLLAPSE: ICD-10-CM

## 2021-06-19 DIAGNOSIS — O99.891 OTHER SPECIFIED DISEASES AND CONDITIONS COMPLICATING PREGNANCY: ICD-10-CM

## 2021-06-19 DIAGNOSIS — Z98.890 OTHER SPECIFIED POSTPROCEDURAL STATES: Chronic | ICD-10-CM

## 2021-06-19 LAB
ALBUMIN SERPL ELPH-MCNC: 3.3 G/DL — LOW (ref 3.4–5)
ALP SERPL-CCNC: 79 U/L — SIGNIFICANT CHANGE UP (ref 40–120)
ALT FLD-CCNC: 12 U/L — SIGNIFICANT CHANGE UP (ref 12–42)
ANION GAP SERPL CALC-SCNC: 9 MMOL/L — SIGNIFICANT CHANGE UP (ref 9–16)
APPEARANCE UR: CLEAR — SIGNIFICANT CHANGE UP
AST SERPL-CCNC: 13 U/L — LOW (ref 15–37)
BACTERIA # UR AUTO: PRESENT /HPF
BILIRUB SERPL-MCNC: 0.7 MG/DL — SIGNIFICANT CHANGE UP (ref 0.2–1.2)
BILIRUB UR-MCNC: ABNORMAL
BUN SERPL-MCNC: 10 MG/DL — SIGNIFICANT CHANGE UP (ref 7–23)
CALCIUM SERPL-MCNC: 8.9 MG/DL — SIGNIFICANT CHANGE UP (ref 8.5–10.5)
CHLORIDE SERPL-SCNC: 104 MMOL/L — SIGNIFICANT CHANGE UP (ref 96–108)
CO2 SERPL-SCNC: 25 MMOL/L — SIGNIFICANT CHANGE UP (ref 22–31)
COLOR SPEC: YELLOW — SIGNIFICANT CHANGE UP
CREAT SERPL-MCNC: 0.72 MG/DL — SIGNIFICANT CHANGE UP (ref 0.5–1.3)
DIFF PNL FLD: NEGATIVE — SIGNIFICANT CHANGE UP
EPI CELLS # UR: ABNORMAL /HPF (ref 0–5)
GLUCOSE SERPL-MCNC: 130 MG/DL — HIGH (ref 70–99)
GLUCOSE UR QL: NEGATIVE — SIGNIFICANT CHANGE UP
HCG SERPL-ACNC: 71 MIU/ML — HIGH
HCT VFR BLD CALC: 34.4 % — LOW (ref 34.5–45)
HGB BLD-MCNC: 11.1 G/DL — LOW (ref 11.5–15.5)
KETONES UR-MCNC: 15 MG/DL
LEUKOCYTE ESTERASE UR-ACNC: ABNORMAL
MCHC RBC-ENTMCNC: 30.2 PG — SIGNIFICANT CHANGE UP (ref 27–34)
MCHC RBC-ENTMCNC: 32.3 GM/DL — SIGNIFICANT CHANGE UP (ref 32–36)
MCV RBC AUTO: 93.7 FL — SIGNIFICANT CHANGE UP (ref 80–100)
NITRITE UR-MCNC: NEGATIVE — SIGNIFICANT CHANGE UP
NRBC # BLD: 0 /100 WBCS — SIGNIFICANT CHANGE UP (ref 0–0)
PH UR: 6 — SIGNIFICANT CHANGE UP (ref 5–8)
PLATELET # BLD AUTO: 295 K/UL — SIGNIFICANT CHANGE UP (ref 150–400)
POTASSIUM SERPL-MCNC: 3.9 MMOL/L — SIGNIFICANT CHANGE UP (ref 3.5–5.3)
POTASSIUM SERPL-SCNC: 3.9 MMOL/L — SIGNIFICANT CHANGE UP (ref 3.5–5.3)
PROT SERPL-MCNC: 7.1 G/DL — SIGNIFICANT CHANGE UP (ref 6.4–8.2)
PROT UR-MCNC: ABNORMAL MG/DL
RBC # BLD: 3.67 M/UL — LOW (ref 3.8–5.2)
RBC # FLD: 13.4 % — SIGNIFICANT CHANGE UP (ref 10.3–14.5)
SODIUM SERPL-SCNC: 138 MMOL/L — SIGNIFICANT CHANGE UP (ref 132–145)
SP GR SPEC: >=1.03 — SIGNIFICANT CHANGE UP (ref 1–1.03)
UROBILINOGEN FLD QL: 1 E.U./DL — SIGNIFICANT CHANGE UP
WBC # BLD: 9.62 K/UL — SIGNIFICANT CHANGE UP (ref 3.8–10.5)
WBC # FLD AUTO: 9.62 K/UL — SIGNIFICANT CHANGE UP (ref 3.8–10.5)
WBC UR QL: ABNORMAL /HPF

## 2021-06-19 PROCEDURE — 93010 ELECTROCARDIOGRAM REPORT: CPT

## 2021-06-19 PROCEDURE — 99284 EMERGENCY DEPT VISIT MOD MDM: CPT

## 2021-06-19 RX ORDER — CEFUROXIME AXETIL 250 MG
1 TABLET ORAL
Qty: 14 | Refills: 0
Start: 2021-06-19 | End: 2021-06-25

## 2021-06-19 RX ORDER — SODIUM CHLORIDE 9 MG/ML
1000 INJECTION INTRAMUSCULAR; INTRAVENOUS; SUBCUTANEOUS ONCE
Refills: 0 | Status: COMPLETED | OUTPATIENT
Start: 2021-06-19 | End: 2021-06-19

## 2021-06-19 RX ADMIN — SODIUM CHLORIDE 1000 MILLILITER(S): 9 INJECTION INTRAMUSCULAR; INTRAVENOUS; SUBCUTANEOUS at 17:33

## 2021-06-19 NOTE — ED PROVIDER NOTE - OBJECTIVE STATEMENT
36 y/o patient presents reporting that she was cleaning a refrigerator when she became lightheaded and dizzy as if she might pass out.  She sat down on the ground and eventually felt better.  She did not lose consciousness, lose bowel/bladder control, bite tongue, or have any shaking.  She also denies prodromal or current chest pain, back pain, abdominal pain, n/v/d/c, fever, chills, rash, headache, vision changes, or any other focal neuro complaints.  Patient states that she had gastric bypass surgery in January which was uncomplicated.  She has lost about 80 pounds since.  Patient also just found out that she is pregnant (LMP ~ 520, 4wks by dates).  Patient is  SAB 2.

## 2021-06-19 NOTE — ED PROVIDER NOTE - PSH
delivery delivered    History of dilation and curettage  with suction. Elective termination of pregnancy

## 2021-06-19 NOTE — ED ADULT NURSE NOTE - OBJECTIVE STATEMENT
Pt came in c/o of dizziness starting today. PT reports . Recent took Pt came in c/o of dizziness starting today. PT reports . Recent took a positive pregnancy test. BEfast negative. Pt ambulatory with steady gait. Denies syncope fever, chills, sob, cp, n/v/d. A&Ox3 speaking in full sentences

## 2021-06-19 NOTE — ED PROVIDER NOTE - PATIENT PORTAL LINK FT
You can access the FollowMyHealth Patient Portal offered by [facility] by registering at the following website: [email or web address]. By joining [employer]’s FollowMyHealth portal, you will also be able to view your health information using other applications (apps) compatible with our system.

## 2021-06-19 NOTE — ED ADULT TRIAGE NOTE - CHIEF COMPLAINT QUOTE
BIBA complaining of dizziness lasting 45mins. Pt states she just found out she's pregnant, had taken approx. 10 pregnancy tests.  LMP 5/20/21. Also reports hx of gastric sleeve January 2021.

## 2021-06-19 NOTE — ED PROVIDER NOTE - NSFOLLOWUPINSTRUCTIONS_ED_ALL_ED_FT
Please see your primary care doctor in the next 2-3 days for a repeat evaluation.  Please take all of today's paperwork with you.  If you don't have a doctor or would like help finding a new one, please contact our call center at [phone].    Please return immediately to the Emergency Department if you have pain, fever, trouble breathing, a rash, or if you have any other problems or concerns at all.   You can reach us at [phone], 24 hours a day, 7 days a week.     Call Dr. Loredo's office and inform him of your pregnancy as soon as possible.  Please arrange OB/GYN followup in the next few weeks.  Stay well hydrated.    Please return immediately if you have fever, pain, vomiting, if you pass out, have chest pain or trouble breathing, or if you have any other problems or concerns at all.

## 2021-06-19 NOTE — ED PROVIDER NOTE - CLINICAL SUMMARY MEDICAL DECISION MAKING FREE TEXT BOX
Nontoxic well appearing patient in no distress presents after a near-syncopal episode.  Patient now feels fine and did not have any worrisome prodromal nor current complaints.  Patient reports being newly pregnant (LMP 5/20, 4wks) with Quant of 71.  No vaginal bleeding, no abdominal pain/tenderness.  Pt now feels well, is ambulatory with steady gait and balance and wants to go home.  UA+ will treat empirically.  Patient is recently s/p gastric bypass and knows that it is essential that she followup with her surgeon (Dr. Loredo) ASAP.  Patient also advised to follow up with OB next week. Importance of close followup and precautions for immediate return discussed.

## 2021-06-19 NOTE — ED PROVIDER NOTE - CARE PLAN
Principal Discharge DX:	Near syncope  Secondary Diagnosis:	Less than 8 weeks gestation of pregnancy

## 2021-06-24 ENCOUNTER — NON-APPOINTMENT (OUTPATIENT)
Age: 36
End: 2021-06-24

## 2021-07-08 ENCOUNTER — APPOINTMENT (OUTPATIENT)
Dept: BARIATRICS | Facility: CLINIC | Age: 36
End: 2021-07-08
Payer: MEDICAID

## 2021-07-08 PROCEDURE — XXXXX: CPT

## 2021-10-25 NOTE — ED PROVIDER NOTE - EYES NEGATIVE STATEMENT, MLM
no lesions,  no deformities,  no traumatic injuries,  no significant scars are present,  chest wall non-tender,  no masses present, breathing is unlabored without accessory muscle use, normal breath sounds
no discharge, no irritation, no pain, no redness, and no visual changes.

## 2022-02-18 NOTE — PATIENT PROFILE ADULT - FALL HARM RISK
Routing refill request to provider for review/approval because:  Controlled substance request.    Last Written Prescription Date:  1/20/22  Last Fill Quantity: 90,  # refills: 0   Last office visit provider:  1/4/22     Requested Prescriptions   Pending Prescriptions Disp Refills     oxyCODONE IR (ROXICODONE) 10 MG tablet [Pharmacy Med Name: OXYCODONE HCL 10 MG TABLET 10 Tablet] 90 tablet 0     Sig: TAKE 1/2 TABLET (5 MG) BY MOUTH EVERY 4 HOURS AS NEEDED FOR SEVERE PAIN       There is no refill protocol information for this order          Verna Espitia RN 02/18/22 2:56 PM  
surgery

## 2022-08-02 ENCOUNTER — NON-APPOINTMENT (OUTPATIENT)
Age: 37
End: 2022-08-02

## 2022-08-04 ENCOUNTER — APPOINTMENT (OUTPATIENT)
Dept: BARIATRICS | Facility: CLINIC | Age: 37
End: 2022-08-04